# Patient Record
Sex: MALE | URBAN - METROPOLITAN AREA
[De-identification: names, ages, dates, MRNs, and addresses within clinical notes are randomized per-mention and may not be internally consistent; named-entity substitution may affect disease eponyms.]

---

## 2019-05-09 ENCOUNTER — TRANSFERRED RECORDS (OUTPATIENT)
Dept: HEALTH INFORMATION MANAGEMENT | Facility: CLINIC | Age: 74
End: 2019-05-09

## 2019-06-12 ENCOUNTER — TRANSFERRED RECORDS (OUTPATIENT)
Dept: HEALTH INFORMATION MANAGEMENT | Facility: CLINIC | Age: 74
End: 2019-06-12

## 2019-07-24 ENCOUNTER — TRANSFERRED RECORDS (OUTPATIENT)
Dept: HEALTH INFORMATION MANAGEMENT | Facility: CLINIC | Age: 74
End: 2019-07-24

## 2020-01-21 ENCOUNTER — TRANSFERRED RECORDS (OUTPATIENT)
Dept: HEALTH INFORMATION MANAGEMENT | Facility: CLINIC | Age: 75
End: 2020-01-21

## 2020-01-27 ENCOUNTER — TRANSCRIBE ORDERS (OUTPATIENT)
Dept: OTHER | Age: 75
End: 2020-01-27

## 2020-01-27 DIAGNOSIS — K86.3 PSEUDOCYST OF PANCREAS: Primary | ICD-10-CM

## 2020-01-29 ENCOUNTER — CARE COORDINATION (OUTPATIENT)
Dept: GASTROENTEROLOGY | Facility: CLINIC | Age: 75
End: 2020-01-29

## 2020-01-29 NOTE — PROGRESS NOTES
Care Coordination New Patient Referral  [facility]    NP referral date: 01/27/20  Referred to MD: Kennedy    Referring MD: Gianluca  Referring contact information see initial referral note  - no - referral sent to panc/bili work que  Referral for unknown    Diagnosis: pancreatic pseudocyst in setting of chronic pancreatitis  Acute pancreatitis 02/20/2019   Alcohol history - stopped drinking 3 years ago    Imaging: ( no imaging as of 10 am  pm on 02/04/20 per imaging department)    CProcedures:  7/24/19 EUS Dr. Hough - see care everywhere  For complete report  1. EGD:  a. Hiatal hernia.  b. Esophagitis at the GE junction.  c. Reactive gastritis.  2. EUS:  a. Large loculated fluid collection superior to the dorsal aspect of the  pancreas extending from the head to body with debris within the  pseudocyst.   b. PD mildly prominent in the body.  c. Common bile duct not dilated but thickened wall. No stones identified  wthin the common bile duct.    Referral will be reviewed when all information has been received; message sent to records 1/30/20 to ask for assistance with obtaining records and imaging as nothing has been received as of today at 11:45 am.     2/4/20 message left at Dr. Hough's office that we have not received updated information or images to process this referral.    02/04/20 Mitch called to say that patient has requested to go to [facility] and we can cancel referral.    Constanza Crawford  BSN, HNBC  RN Care Coordinator  [facility]  Ph: [phone]  Email: [email or web address]